# Patient Record
Sex: FEMALE | Employment: FULL TIME | URBAN - NONMETROPOLITAN AREA
[De-identification: names, ages, dates, MRNs, and addresses within clinical notes are randomized per-mention and may not be internally consistent; named-entity substitution may affect disease eponyms.]

---

## 2021-07-15 ENCOUNTER — OFFICE VISIT (OUTPATIENT)
Dept: URGENT CARE | Facility: PHYSICIAN GROUP | Age: 41
End: 2021-07-15
Payer: COMMERCIAL

## 2021-07-15 VITALS
TEMPERATURE: 97.2 F | SYSTOLIC BLOOD PRESSURE: 122 MMHG | RESPIRATION RATE: 16 BRPM | HEIGHT: 63 IN | DIASTOLIC BLOOD PRESSURE: 86 MMHG | HEART RATE: 73 BPM | BODY MASS INDEX: 47.66 KG/M2 | WEIGHT: 269 LBS | OXYGEN SATURATION: 100 %

## 2021-07-15 DIAGNOSIS — J01.40 ACUTE NON-RECURRENT PANSINUSITIS: ICD-10-CM

## 2021-07-15 DIAGNOSIS — H69.93 DYSFUNCTION OF BOTH EUSTACHIAN TUBES: ICD-10-CM

## 2021-07-15 DIAGNOSIS — R42 VERTIGO: ICD-10-CM

## 2021-07-15 PROCEDURE — 99203 OFFICE O/P NEW LOW 30 MIN: CPT | Performed by: PHYSICIAN ASSISTANT

## 2021-07-15 RX ORDER — METHYLPREDNISOLONE 4 MG/1
TABLET ORAL
Qty: 21 TABLET | Refills: 0 | Status: SHIPPED | OUTPATIENT
Start: 2021-07-15

## 2021-07-15 RX ORDER — FLUTICASONE PROPIONATE 50 MCG
1 SPRAY, SUSPENSION (ML) NASAL DAILY
Qty: 16 G | Refills: 0 | Status: SHIPPED | OUTPATIENT
Start: 2021-07-15

## 2021-07-15 RX ORDER — MECLIZINE HYDROCHLORIDE 25 MG/1
25 TABLET ORAL 3 TIMES DAILY PRN
Qty: 30 TABLET | Refills: 0 | Status: SHIPPED | OUTPATIENT
Start: 2021-07-15

## 2021-07-16 ENCOUNTER — TELEPHONE (OUTPATIENT)
Dept: URGENT CARE | Facility: PHYSICIAN GROUP | Age: 41
End: 2021-07-16

## 2021-07-16 NOTE — PROGRESS NOTES
"Subjective:   Jacqueline Jean is a 40 y.o. female who presents for Ear Pain (states causing dizziness, x3-4days ) and Sinus Problem      HPI  Patient is a 40-year-old female who presents to clinic with complaints of bilateral ear pressure, sinus pressure, nasal congestion, intermittent dizzy spells onset 4 days ago.  She had a history of benign positional vertigo at one point due to similar symptoms.  She was prescribed a Medrol Dosepak with relief. Ear pressure is exacerbated with elevation changes. This morning, she had a brief dizzy spell described as the room is spinning.  Denies any lightheadedness or loss of consciousness.  She had associated nausea but no vomiting.  Dizziness is worse with getting up, moving head and turning head.  History of sinus infection in the past. Denies fever, chills, cough, sore throat, chest pain, SOB, abdominal pain, vomiting, diarrhea.         ROS    Medications:    • This patient does not have an active medication from one of the medication groupers.    Allergies: Patient has no known allergies.    Problem List: Jacqueline Jean does not have a problem list on file.    Surgical History:  No past surgical history on file.    Past Social Hx: Jacqueline Jean  reports that she has never smoked. She has never used smokeless tobacco. She reports previous alcohol use. She reports previous drug use.     Past Family Hx:  Jacqueline Jean family history is not on file.     Problem list, medications, and allergies reviewed by myself today in Epic.     Objective:     /86   Pulse 73   Temp 36.2 °C (97.2 °F) (Temporal)   Resp 16   Ht 1.6 m (5' 3\")   Wt 122 kg (269 lb)   SpO2 100%   BMI 47.65 kg/m²     Physical Exam  Vitals reviewed.   Constitutional:       General: She is not in acute distress.     Appearance: Normal appearance. She is not ill-appearing or toxic-appearing.   HENT:      Right Ear: Tympanic membrane normal.      Left Ear: Tympanic membrane normal.      Nose: Mucosal edema " and rhinorrhea present. Rhinorrhea is clear.      Right Sinus: No maxillary sinus tenderness or frontal sinus tenderness.      Left Sinus: No maxillary sinus tenderness or frontal sinus tenderness.      Mouth/Throat:      Mouth: Mucous membranes are moist.      Pharynx: Oropharynx is clear. No oropharyngeal exudate or posterior oropharyngeal erythema.   Eyes:      Extraocular Movements: Extraocular movements intact.      Right eye: No nystagmus.      Left eye: No nystagmus.      Conjunctiva/sclera: Conjunctivae normal.      Pupils: Pupils are equal, round, and reactive to light.   Cardiovascular:      Rate and Rhythm: Normal rate and regular rhythm.      Heart sounds: Normal heart sounds.   Pulmonary:      Effort: Pulmonary effort is normal. No respiratory distress.      Breath sounds: Normal breath sounds. No wheezing, rhonchi or rales.   Musculoskeletal:      Cervical back: Neck supple.   Lymphadenopathy:      Cervical: No cervical adenopathy.   Skin:     General: Skin is warm and dry.   Neurological:      General: No focal deficit present.      Mental Status: She is alert and oriented to person, place, and time.      Cranial Nerves: Cranial nerves are intact.      Sensory: Sensation is intact.      Motor: Motor function is intact.      Gait: Gait is intact.      Deep Tendon Reflexes:      Reflex Scores:       Patellar reflexes are 2+ on the right side and 2+ on the left side.       Achilles reflexes are 2+ on the right side and 2+ on the left side.  Psychiatric:         Mood and Affect: Mood normal.         Behavior: Behavior normal.         Assessment/Associated Orders     1. Acute non-recurrent pansinusitis  methylPREDNISolone (MEDROL DOSEPAK) 4 MG Tablet Therapy Pack    fluticasone (FLONASE) 50 MCG/ACT nasal spray   2. Vertigo  meclizine (ANTIVERT) 25 MG Tab   3. Dysfunction of both eustachian tubes  methylPREDNISolone (MEDROL DOSEPAK) 4 MG Tablet Therapy Pack    fluticasone (FLONASE) 50 MCG/ACT nasal spray        Medical Decision Making      Treatment as above.   I do not suspect bacterial etiology at this time.  Suspected  Benign positional vertigo.  Ronel-Hallpike was not performed due to no current nystagmus.  Encouraged plenty of fluids, Flonase, nasal saline washes or rubi pot, Mucinex, Ibuprofen and Tylenol for pain. They may take a non-drowsy oral antihistamine in the morning.     Advised to return to the clinic or present to the ED if any worsening symptoms or fever, chills, vision changes, difficulty breathing, or any other concerns.     I personally reviewed prior external notes and test results pertinent to today's visit. Supportive care, natural history, differential diagnoses, and indications for immediate follow-up discussed. Patient expresses understanding and agrees to plan. Patient denies any other questions or concerns.     Advised the patient to follow-up with the primary care physician for recheck, reevaluation, and consideration of further management.    Please note that this dictation was created using voice recognition software. I have made a reasonable attempt to correct obvious errors, but I expect that there are errors of grammar and possibly content that I did not discover before finalizing the note.    This note was electronically signed by Darren Wright PA-C

## 2021-07-16 NOTE — TELEPHONE ENCOUNTER
Patient called stating [facility] was unable to fill scripts and requested they be sent to [facility]. Called in medications to pharmacy and spoke to Pharmacist. Cancelled scripts with [facility].